# Patient Record
Sex: FEMALE | Race: WHITE | Employment: FULL TIME | URBAN - METROPOLITAN AREA
[De-identification: names, ages, dates, MRNs, and addresses within clinical notes are randomized per-mention and may not be internally consistent; named-entity substitution may affect disease eponyms.]

---

## 2017-05-15 ENCOUNTER — OFFICE VISIT (OUTPATIENT)
Dept: HEMATOLOGY | Age: 22
End: 2017-05-15

## 2017-05-15 VITALS
OXYGEN SATURATION: 100 % | WEIGHT: 132.38 LBS | SYSTOLIC BLOOD PRESSURE: 107 MMHG | DIASTOLIC BLOOD PRESSURE: 65 MMHG | HEIGHT: 65 IN | TEMPERATURE: 98.9 F | BODY MASS INDEX: 22.06 KG/M2 | HEART RATE: 69 BPM

## 2017-05-15 DIAGNOSIS — B18.2 CHRONIC HEPATITIS C WITHOUT HEPATIC COMA (HCC): ICD-10-CM

## 2017-05-15 DIAGNOSIS — B18.2 CHRONIC HEPATITIS C WITHOUT HEPATIC COMA (HCC): Primary | ICD-10-CM

## 2017-05-15 PROBLEM — F41.9 ANXIETY: Status: ACTIVE | Noted: 2017-05-15

## 2017-05-15 PROBLEM — F90.9 ADHD (ATTENTION DEFICIT HYPERACTIVITY DISORDER): Status: ACTIVE | Noted: 2017-05-15

## 2017-05-15 RX ORDER — DEXTROAMPHETAMINE SACCHARATE, AMPHETAMINE ASPARTATE, DEXTROAMPHETAMINE SULFATE AND AMPHETAMINE SULFATE 3.75; 3.75; 3.75; 3.75 MG/1; MG/1; MG/1; MG/1
15 TABLET ORAL 2 TIMES DAILY
COMMUNITY
End: 2018-07-09

## 2017-05-15 RX ORDER — ESCITALOPRAM OXALATE 5 MG/1
TABLET ORAL DAILY
COMMUNITY
End: 2018-07-09

## 2017-05-15 NOTE — PROGRESS NOTES
Jon Pérez MD, LASHAWN Rosario PA-C Earlyne Lease, MD, Beaver, MD Tari Castaneda, LASHAWN Hebert NP        [address] [facility], [address] [address]     [phone]     FAX: 411 [address], [address] [place] [address], [address]     [phone]     FAX: [phone]       Patient Care Team:  Jonas Peterson MD as PCP - General (Family Practice)      Problem List  Date Reviewed: 5/15/2017          Codes Class Noted    Chronic hepatitis C without hepatic coma [facility]) ICD-10-CM: B18.2  ICD-9-CM: 070.54  5/15/2017        Anxiety ICD-10-CM: F41.9  ICD-9-CM: 300.00  5/15/2017        ADHD (attention deficit hyperactivity disorder) ICD-10-CM: F90.9  ICD-9-CM: 314.01  5/15/2017        Intravenous drug abuse in remission ICD-10-CM: F19.10  ICD-9-CM: 305.93  9/14/2013    Overview Signed 5/15/2017 10:56 AM by Shemar Clements MD     Drug use for 3-6 months only during 2014                     The physicians listed above have asked me to see Lisa Romeo in consultation regarding chronic HCV and its management. All medical records sent by the referring physicians were reviewed     The patient is a 24 y.o.  female who was noted to have abnormalities in liver chemistries and subsequently tested positive for chronic HCV in 2/2017. Risk factors for acquiring HCV are IV drug use only for several months during 2014. Imaging of the liver was not performed. An assessment of liver fibrosis with biopsy or elastography has not been performed. The patient has never received treatment for chronic HCV.       The most recent laboratory studies indicate that the liver transaminases are normal, alkaline phosphatase is normal, tests of hepatic synthetic and metabolic function are normal, and the platelet count is normal.    The patient notes fatigue,     The patient has not experienced pain in the right side over the liver,     The patient completes all daily activities without any functional limitations. ALLERGIES  Allergies   Allergen Reactions    Clonidine Hives    Naproxen Other (comments)     Pt states it makes her hot and irritable.  Vicodin [Hydrocodone-Acetaminophen] Nausea Only       MEDICATIONS  Current Outpatient Prescriptions   Medication Sig    dextroamphetamine-amphetamine (ADDERALL) 15 mg tablet Take 15 mg by mouth two (2) times a day.  escitalopram oxalate (LEXAPRO) 5 mg tablet Take  by mouth daily.  ibuprofen (MOTRIN) 600 mg tablet Take 1 tablet by mouth every six (6) hours as needed for Pain.  ondansetron (ZOFRAN ODT) 4 mg disintegrating tablet Take 1 tablet by mouth every eight (8) hours as needed for Nausea.  diclofenac EC (VOLTAREN) 75 mg EC tablet Take 1 tablet by mouth two (2) times a day.  oxyCODONE (OXYIR) 5 mg capsule Take 1 capsule by mouth every four (4) hours as needed.  QUEtiapine (SEROQUEL) 25 mg tablet     venlafaxine-SR (EFFEXOR-XR) 150 mg capsule     amitriptyline (ELAVIL) 75 mg tablet Take 1 Tab by mouth nightly.  citalopram (CELEXA) 40 mg tablet Take 1 Tab by mouth every morning.  LORazepam (ATIVAN) 0.5 mg tablet Take 1 Tab by mouth two (2) times a day.  LORazepam (ATIVAN) 1 mg tablet Take 1 Tab by mouth nightly. No current facility-administered medications for this visit. SYSTEM REVIEW NOT RELATED TO LIVER DISEASE OR REVIEWED ABOVE:  Constitution systems: Negative for fever, chills, weight gain, weight loss. Eyes: Negative for visual changes. ENT: Negative for sore throat, painful swallowing. Respiratory: Negative for cough, hemoptysis, SOB. Cardiology: Negative for chest pain, palpitations. GI:  Negative for constipation or diarrhea.   : Negative for urinary frequency, dysuria, hematuria, nocturia. Skin: Negative for rash. Hematology: Negative for easy bruising, blood clots. Musculo-skelatal: Negative for back pain, muscle pain, weakness. Neurologic: Negative for headaches, dizziness, vertigo, memory problems not related to HE. Psychology: Negative for anxiety, depression. FAMILY HISTORY:  The father is alive and healthy. The mother has the following chronic diseases: skin cancer. There is no family history of liver disease. SOCIAL HISTORY:  The patient has never been . The patient has no children. The patient has never used tobacco products. The patient has never consumed significant amounts of alcohol. The patient currently works full time . PHYSICAL EXAMINATION:  /65  Pulse 69  Temp 98.9 °F (37.2 °C) (Tympanic)   Ht 5' 5\" (1.651 m)  Wt 132 lb 6 oz (60 kg)  SpO2 100%  BMI 22.03 kg/m2  General: No acute distress. Eyes: Sclera anicteric. ENT: No oral lesions. Thyroid normal.  Nodes: No adenopathy. Skin: No spider angiomata. No jaundice. No palmar erythema. Respiratory: Lungs clear to auscultation. Cardiovascular: Regular heart rate. No murmurs. No JVD. Abdomen: Soft non-tender. Liver size normal to percussion/palpation. Spleen not palpable. No obvious ascites. Extremities: No edema. No muscle wasting. No gross arthritic changes. Neurologic: Alert and oriented. Cranial nerves grossly intact. No asterixis. LABORATORY STUDIES:  From 2/2017  AST/ALT/ALP/T Bili/ALB:  29/30/65/0.2/4.4  WBC/HB/PLT/INR:    BUN/CREAT:  13/0.62    SEROLOGIES:  1/2017. HCV RNA Log 2.3 intU  2/2017. HCV genotype 1A, HCV RNA Log 3.9 intU    LIVER HISTOLOGY:  Not available or performed    ENDOSCOPIC PROCEDURES:  Not available or performed    RADIOLOGY:  Not available or performed    OTHER TESTING:  Not available or performed    ASSESSMENT AND PLAN:  Chronic HCV of unclear severity but almost certainly she has no fibrosis.     Liver function is normal.      Will perform laboratory testing to monitor liver function and degree of liver injury. This included BMP, hepatic panel, CBC with platelet count,     Will perform and/or review results of HCV viral load and HCV genotype to define the specific treatment and duration of treatment that will be required. Will perform serologic and virologic studies to assess for other causes of chronic liver disease. Will perform imaging of the liver with ultrasound. There is no reason to perform liver biopsy at this time. The Fibroscan can assess liver fibrosis and determine if a patient has advanced fibrosis or cirrhosis without the need for liver biopsy. The Fibroscan is currently available at [facility]. This will be performed at the next office visit. The patient has not previously been treated for HCV. The patient has HCV genotype 1A. Discussed the treatment alternatives. The SVR/cure rate for HCV now exceeds 90% with just oral anti-viral therapy and no interferon injections or significant side effects for most patients with HCV. The specific treatment is dependent upon genotype, viral load and histology. The patient should be treated with [place] Regulus or [employer]. The SVR/cure rate for either of these regimens exceeds 95%. The patient was directed to continue all current medications at the current dosages. There are no contraindications for the patient to take any medications that are necessary for treatment of other medical issues. The patient was counseled regarding alcohol consumption. The patient was counseled regarding use of illicit drugs. She has now been drug free for 3 years will extremely low likelihood of using drugs again. The need for vaccination against viral hepatitis A and B will be assessed with serologic and instituted as appropriate. All of the above issues were discussed with the patient.   All questions were answered. The patient expressed a clear understanding of the above. Follow-up Faizan Urbina 32 in for Fibroscan, and to initiate HCV treatment.     Jacki Amaya MD  Liver [place] [address], [facility] 22.  [phone]

## 2017-05-16 LAB
ALBUMIN SERPL-MCNC: 4.3 G/DL (ref 3.5–5.5)
ALP SERPL-CCNC: 68 IU/L (ref 39–117)
ALT SERPL-CCNC: 65 IU/L (ref 0–32)
AST SERPL-CCNC: 51 IU/L (ref 0–40)
BASOPHILS # BLD AUTO: 0 X10E3/UL (ref 0–0.2)
BASOPHILS NFR BLD AUTO: 0 %
BILIRUB DIRECT SERPL-MCNC: 0.1 MG/DL (ref 0–0.4)
BILIRUB SERPL-MCNC: 0.4 MG/DL (ref 0–1.2)
BUN SERPL-MCNC: 13 MG/DL (ref 6–20)
BUN/CREAT SERPL: 21 (ref 9–23)
CALCIUM SERPL-MCNC: 9.1 MG/DL (ref 8.7–10.2)
CHLORIDE SERPL-SCNC: 99 MMOL/L (ref 96–106)
CO2 SERPL-SCNC: 25 MMOL/L (ref 18–29)
CREAT SERPL-MCNC: 0.62 MG/DL (ref 0.57–1)
EOSINOPHIL # BLD AUTO: 0.3 X10E3/UL (ref 0–0.4)
EOSINOPHIL NFR BLD AUTO: 5 %
ERYTHROCYTE [DISTWIDTH] IN BLOOD BY AUTOMATED COUNT: 12.3 % (ref 12.3–15.4)
GLUCOSE SERPL-MCNC: 104 MG/DL (ref 65–99)
HAV AB SER QL IA: NEGATIVE
HBV CORE AB SERPL QL IA: NEGATIVE
HBV SURFACE AB SER QL: NON REACTIVE
HBV SURFACE AG SERPL QL IA: NEGATIVE
HCT VFR BLD AUTO: 39.3 % (ref 34–46.6)
HGB BLD-MCNC: 13 G/DL (ref 11.1–15.9)
IMM GRANULOCYTES # BLD: 0 X10E3/UL (ref 0–0.1)
IMM GRANULOCYTES NFR BLD: 0 %
LYMPHOCYTES # BLD AUTO: 2.2 X10E3/UL (ref 0.7–3.1)
LYMPHOCYTES NFR BLD AUTO: 32 %
MCH RBC QN AUTO: 28.8 PG (ref 26.6–33)
MCHC RBC AUTO-ENTMCNC: 33.1 G/DL (ref 31.5–35.7)
MCV RBC AUTO: 87 FL (ref 79–97)
MONOCYTES # BLD AUTO: 0.7 X10E3/UL (ref 0.1–0.9)
MONOCYTES NFR BLD AUTO: 10 %
NEUTROPHILS # BLD AUTO: 3.7 X10E3/UL (ref 1.4–7)
NEUTROPHILS NFR BLD AUTO: 53 %
PLATELET # BLD AUTO: 278 X10E3/UL (ref 150–379)
POTASSIUM SERPL-SCNC: 4.1 MMOL/L (ref 3.5–5.2)
PROT SERPL-MCNC: 7.2 G/DL (ref 6–8.5)
RBC # BLD AUTO: 4.52 X10E6/UL (ref 3.77–5.28)
SODIUM SERPL-SCNC: 138 MMOL/L (ref 134–144)
WBC # BLD AUTO: 7 X10E3/UL (ref 3.4–10.8)

## 2017-05-19 LAB
HCV RNA SERPL NAA+PROBE-ACNC: NORMAL IU/ML
HCV RNA SERPL NAA+PROBE-LOG IU: 4.19 LOG10 IU/ML
HCV RNA SERPL QL NAA+PROBE: POSITIVE
TEST INFORMATION: NORMAL

## 2017-05-26 LAB
HCV NS5 MUT DET ISLT GENOTYP: NORMAL
HCV RESIS PANELL ISLT GENOTYP: NORMAL
REF LAB TEST METHOD: NORMAL

## 2017-07-14 ENCOUNTER — OFFICE VISIT (OUTPATIENT)
Dept: HEMATOLOGY | Age: 22
End: 2017-07-14

## 2017-07-14 VITALS
BODY MASS INDEX: 23.33 KG/M2 | WEIGHT: 140.2 LBS | HEART RATE: 96 BPM | TEMPERATURE: 97.6 F | SYSTOLIC BLOOD PRESSURE: 121 MMHG | OXYGEN SATURATION: 98 % | DIASTOLIC BLOOD PRESSURE: 57 MMHG

## 2017-07-14 DIAGNOSIS — B18.2 CHRONIC HEPATITIS C WITHOUT HEPATIC COMA (HCC): Primary | ICD-10-CM

## 2017-07-14 RX ORDER — LEDIPASVIR AND SOFOSBUVIR 90; 400 MG/1; MG/1
90-400 TABLET, FILM COATED ORAL DAILY
Qty: 28 TAB | Refills: 1 | Status: SHIPPED | OUTPATIENT
Start: 2017-07-14 | End: 2018-07-09 | Stop reason: ALTCHOICE

## 2017-07-14 NOTE — MR AVS SNAPSHOT
Visit Information Date & Time Provider Department Dept. Phone Encounter #  
 7/14/2017  8:30 AM Carolina Walton, [address] [facility] [address] [place] [address] [id number] Upcoming Health Maintenance Date Due  
 HPV AGE 9Y-34Y (1 of 3 - Female 3 Dose Series) 5/27/2005 Pneumococcal 19-64 Medium Risk (1 of 1 - PPSV23) 5/27/2013 DTaP/Tdap/Td series (1 - Tdap) 5/27/2015 PAP AKA CERVICAL CYTOLOGY 5/27/2015 INFLUENZA AGE 9 TO ADULT 8/1/2017 Allergies as of 7/14/2017  Review Complete On: 7/14/2017 By: Sobia Sullivan Severity Noted Reaction Type Reactions Clonidine  05/15/2017    Hives Naproxen  10/30/2014    Other (comments) Pt states it makes her hot and irritable. Vicodin [Hydrocodone-acetaminophen]  09/13/2013    Nausea Only Current Immunizations  Never Reviewed Name Date Influenza High Dose Vaccine PF  Deferred (Patient Refused) Influenza Vaccine (Trivalent)  Deferred (Patient Refused) Not reviewed this visit You Were Diagnosed With   
  
 Codes Comments Chronic hepatitis C without hepatic coma (HCC)    -  Primary ICD-10-CM: B18.2 ICD-9-CM: 070.54 Vitals BP Pulse Temp Weight(growth percentile) SpO2 BMI  
 121/57 96 97.6 °F (36.4 °C) (Oral) 140 lb 3.2 oz (63.6 kg) 98% 23.33 kg/m2 OB Status Smoking Status Having regular periods Former Smoker BMI and BSA Data Body Mass Index Body Surface Area  
 23.33 kg/m 2 1.71 m 2 Preferred Pharmacy Pharmacy Name Phone [facility] [address] [phone] Your Updated Medication List  
  
   
This list is accurate as of: 7/14/17  9:25 AM.  Always use your most recent med list.  
  
  
  
  
 ADDERALL 15 mg tablet Generic drug:  dextroamphetamine-amphetamine Take 15 mg by mouth two (2) times a day. LEXAPRO 5 mg tablet Generic drug:  escitalopram oxalate Take  by mouth daily. ondansetron 4 mg disintegrating tablet Commonly known as:  ZOFRAN ODT Take 1 tablet by mouth every eight (8) hours as needed for Nausea. We Performed the Following LIVER ELASTOGRAPHY W/O IMAG W/I&R [37169 CPT(R)] Introducing [facility] SERVICES! Dear Nazanin Hunt: Thank you for requesting a PT PAL account. Our records indicate that you already have an active PT PAL account. You can access your account anytime at [email or web address] Did you know that you can access your hospital and ER discharge instructions at any time in PT PAL? You can also review all of your test results from your hospital stay or ER visit. Additional Information If you have questions, please visit the Frequently Asked Questions section of the PT PAL website at [email or web address] Remember, PT PAL is NOT to be used for urgent needs. For medical emergencies, dial 911. Now available from your iPhone and Android! Please provide this summary of care documentation to your next provider. Your primary care clinician is listed as Hayley Up. If you have any questions after today's visit, please call [phone].

## 2017-07-14 NOTE — PROGRESS NOTES
Jon Morton MD, LASHAWN Ramirez PA-C Senaida Notice, MD, FACP, MD Tari Haley NP Susannah Charleston, NP        [address] [facility], [address] [address].     [phone]     FAX: 048 [facility], [address] [place] [address], [address]     [phone]     FAX: [phone]       Patient Care Team:  Martine Camacho MD as PCP - General (Family Practice)  Madeline Moreira MD (Gastroenterology)      Problem List  Date Reviewed: 7/14/2017          Codes Class Noted    Chronic hepatitis C without hepatic coma [facility]) ICD-10-CM: B18.2  ICD-9-CM: 070.54  5/15/2017        Anxiety ICD-10-CM: F41.9  ICD-9-CM: 300.00  5/15/2017        ADHD (attention deficit hyperactivity disorder) ICD-10-CM: F90.9  ICD-9-CM: 314.01  5/15/2017        Intravenous drug abuse in remission ICD-10-CM: F19.10  ICD-9-CM: 305.93  9/14/2013    Overview Signed 5/15/2017 10:56 AM by Karlene Mast MD     Drug use for 3-6 months only during 2014                 Kareen Connor returns to the [facility] for management of chronic HCV and to perform in-office fibroscan. The active problem list, all pertinent past medical history, medications, radiologic findings and laboratory findings related to the liver disorder were reviewed with the patient. The patient is a 20 y.o.  female who was noted to have abnormalities in liver chemistries and subsequently tested positive for chronic HCV in 2/2017. Risk factors for acquiring HCV are IV drug use only for several months during 2014. Imaging of the liver was performed in 2014, showing unremarkable liver. An assessment of liver fibrosis with biopsy or elastography has not been performed. This is planned for today's office visit.     The patient has never received treatment for chronic HCV. The most recent laboratory studies indicate that the liver transaminases are normal, alkaline phosphatase is normal, tests of hepatic synthetic and metabolic function are normal, and the platelet count is normal.    The patient notes fatigue. The patient has not experienced pain in the right side over the liver. The patient completes all daily activities without any functional limitations. ALLERGIES  Allergies   Allergen Reactions    Clonidine Hives    Naproxen Other (comments)     Pt states it makes her hot and irritable.  Vicodin [Hydrocodone-Acetaminophen] Nausea Only       MEDICATIONS  Current Outpatient Prescriptions   Medication Sig    dextroamphetamine-amphetamine (ADDERALL) 15 mg tablet Take 15 mg by mouth two (2) times a day.  escitalopram oxalate (LEXAPRO) 5 mg tablet Take  by mouth daily.  ondansetron (ZOFRAN ODT) 4 mg disintegrating tablet Take 1 tablet by mouth every eight (8) hours as needed for Nausea. No current facility-administered medications for this visit. SYSTEM REVIEW NOT RELATED TO LIVER DISEASE OR REVIEWED ABOVE:  Constitution systems: Negative for fever, chills, weight gain, weight loss. Eyes: Negative for visual changes. ENT: Negative for sore throat, painful swallowing. Respiratory: Negative for cough, hemoptysis, SOB. Cardiology: Negative for chest pain, palpitations. GI:  Negative for constipation or diarrhea. : Negative for urinary frequency, dysuria, hematuria, nocturia. Skin: Negative for rash. Hematology: Negative for easy bruising, blood clots. Musculo-skeletal: Negative for back pain, muscle pain, weakness. Neurologic: Negative for headaches, dizziness, vertigo, memory problems not related to HE. Psychology: Negative for anxiety, depression. FAMILY HISTORY:  The father is alive and healthy. The mother has the following chronic diseases: skin cancer.     There is no family history of liver disease. SOCIAL HISTORY:  The patient has never been . The patient has no children. The patient has never used tobacco products. The patient has never consumed significant amounts of alcohol. The patient currently works full time . PHYSICAL EXAMINATION:  /57  Pulse 96  Temp 97.6 °F (36.4 °C) (Oral)   Wt 140 lb 3.2 oz (63.6 kg)  SpO2 98%  BMI 23.33 kg/m2  General: No acute distress. Eyes: Sclera anicteric. ENT: No oral lesions. Thyroid normal.  Nodes: No adenopathy. Skin: No spider angiomata. No jaundice. No palmar erythema. Respiratory: Lungs clear to auscultation. Cardiovascular: Regular heart rate. No murmurs. No JVD. Abdomen: Soft non-tender. Liver size normal to percussion/palpation. Spleen not palpable. No obvious ascites. Extremities: No edema. No muscle wasting. No gross arthritic changes. Neurologic: Alert and oriented. Cranial nerves grossly intact. No asterixis.     LABORATORY STUDIES:  [address] & Units 5/15/2017 11/17/2014   WBC 3.4 - 10.8 x10E3/uL 7.0 7.5   ANC 1.4 - 7.0 x10E3/uL 3.7 3.5   HGB 11.1 - 15.9 g/dL 13.0 12.1    - 379 x10E3/uL 278 285   AST 0 - 40 IU/L 51 (H) 568 (H)   ALT 0 - 32 IU/L 65 (H) 856 (H)   Alk Phos 39 - 117 IU/L 68 152 (H)   Bili, Total 0.0 - 1.2 mg/dL 0.4 3.9 (H)   Bili, Direct 0.00 - 0.40 mg/dL 0.10 2.9 (H)   Albumin 3.5 - 5.5 g/dL 4.3 3.3 (L)   BUN 6 - 20 mg/dL 13 9   Creat 0.57 - 1.00 mg/dL 0.62 0.63   Na 134 - 144 mmol/L 138 138   K 3.5 - 5.2 mmol/L 4.1 3.9   Cl 96 - 106 mmol/L 99 103   CO2 18 - 29 mmol/L 25 26   Glucose 65 - 99 mg/dL 104 (H) 105 (H)     Liver [facility] Latest Ref Rng & Units 10/8/2014   WBC 3.4 - 10.8 x10E3/uL 9.8   ANC 1.4 - 7.0 x10E3/uL 3.8   HGB 11.1 - 15.9 g/dL 11.4 (L)    - 379 x10E3/uL 298   AST 0 - 40 IU/L    ALT 0 - 32 IU/L    Alk Phos 39 - 117 IU/L    Bili, Total 0.0 - 1.2 mg/dL    Bili, Direct 0.00 - 0.40 mg/dL    Albumin 3.5 - 5.5 g/dL    BUN 6 - 20 mg/dL 14   Creat 0.57 - 1.00 mg/dL 0.70   Na 134 - 144 mmol/L 139   K 3.5 - 5.2 mmol/L 4.0   Cl 96 - 106 mmol/L 102   CO2 18 - 29 mmol/L 26   Glucose 65 - 99 mg/dL 99   From 2/2017  AST/ALT/ALP/T Bili/ALB:  29/30/65/0.2/4.4  WBC/HB/PLT/INR:    BUN/CREAT:  13/0.62    SEROLOGIES:  Serologies Latest Ref Rng & Units 5/15/2017 11/17/2014   Hep A Ab, Total Negative Negative    Hep B Surface Ag Negative Negative NEGATIVE   Hep B Core Ab, Total Negative Negative    Hep B Surface AB QL  Non Reactive    Hep C Ab 0.0 - 0.9 s/co ratio     HCV RT-PCR, Quant IU/mL [id number]      Serologies Latest Ref Rng & Units 8/15/2014   Hep A Ab, Total Negative    Hep B Surface Ag Negative Negative   Hep B Core Ab, Total Negative    Hep B Surface AB QL     Hep C Ab 0.0 - 0.9 s/co ratio <0.1   HCV RT-PCR, Quant IU/mL    1/2017. HCV RNA Log 2.3 intU  2/2017. HCV genotype 1A, HCV RNA Log 3.9 intU    LIVER HISTOLOGY:  7/2017. FibroScan performed at [address]. EkPa was 4.8. Suggested fibrosis level is F0-1. ENDOSCOPIC PROCEDURES:  Not available or performed    RADIOLOGY:  11/2014. Ultrasound of liver. Normal appearing liver. No liver mass lesions. OTHER TESTING:  Not available or performed    ASSESSMENT AND PLAN:  Chronic HCV in the setting of minimal to portal fibrosis. Liver function is normal.      Serologic and virologic studies to assess for other causes of chronic liver disease were negative. The patient has not previously been treated for HCV. The patient has HCV genotype 1A. We have discussed the treatment alternatives. The SVR/cure rate for HCV now exceeds 90% with just oral anti-viral therapy and no interferon injections or significant side effects for most patients with HCV. The specific treatment is dependent upon genotype, viral load and histology.   Given her baseline viral characteristics, she should be treated with a 8 week course of [place] the SVR/cure rate for this regimen exceeds 95%. The patient was directed to continue all current medications at the current dosages. There are no contraindications for the patient to take any medications that are necessary for treatment of other medical issues. The patient was counseled regarding alcohol consumption. The patient was counseled regarding use of illicit drugs. She has now been drug free for 3 years will extremely low likelihood of using drugs again. Vaccination for viral hepatitis A and B is recommended since the patient has no serologic evidence of previous exposure or vaccination with immunity. All of the above issues were discussed with the patient. All questions were answered. The patient expressed a clear understanding of the above. [address] [address] within 4 weeks of initiation of HCV treatment.     Zach Wise PA-C  Liver [place] [address], [address] [address]  [address].  [phone]

## 2018-07-09 ENCOUNTER — OFFICE VISIT (OUTPATIENT)
Dept: HEMATOLOGY | Age: 23
End: 2018-07-09

## 2018-07-09 VITALS
HEART RATE: 70 BPM | WEIGHT: 150.2 LBS | BODY MASS INDEX: 24.99 KG/M2 | TEMPERATURE: 98.3 F | SYSTOLIC BLOOD PRESSURE: 115 MMHG | OXYGEN SATURATION: 95 % | DIASTOLIC BLOOD PRESSURE: 76 MMHG

## 2018-07-09 DIAGNOSIS — B18.2 CHRONIC HEPATITIS C WITHOUT HEPATIC COMA (HCC): Primary | ICD-10-CM

## 2018-07-09 RX ORDER — TRAZODONE HYDROCHLORIDE 100 MG/1
TABLET ORAL
Refills: 1 | COMMUNITY
Start: 2018-07-07

## 2018-07-09 RX ORDER — VENLAFAXINE HYDROCHLORIDE 75 MG/1
CAPSULE, EXTENDED RELEASE ORAL
Refills: 1 | COMMUNITY
Start: 2018-07-02

## 2018-07-09 NOTE — MR AVS SNAPSHOT
[address] [facility] .[phone] [place] [address] 
[phone] Patient: Santi Montaño MRN: [id number] :1995 Visit Information Date & Time Provider Department Dept. Phone Encounter #  
 2018  1:30 PM Hayde Valiente, [address] [address] [id number] Follow-up Instructions Return in about 3 months (around 10/9/2018) for Marily Briones. Your Appointments 10/9/2018  2:30 PM  
Follow Up with MIRIAM Major 75 [facility] Appt Note: Follow up appt with Ranjana Hernández 80 [facility] .[phone] [facility] [address]  
59 [facility] Jerel [address] Upcoming Health Maintenance Date Due  
 HPV Age 9Y-34Y (1 of 1 - Female 3 Dose Series) 2005 Pneumococcal 19-64 Medium Risk (1 of 1 - PPSV23) 2013 DTaP/Tdap/Td series (1 - Tdap) 2015 PAP AKA CERVICAL CYTOLOGY 2015 Influenza Age 5 to Adult 2018 Allergies as of 2018  Review Complete On: 2018 By: MIRIAM Major Severity Noted Reaction Type Reactions Clonidine  05/15/2017    Hives Naproxen  10/30/2014    Other (comments) Pt states it makes her hot and irritable. Vicodin [Hydrocodone-acetaminophen]  2013    Nausea Only Current Immunizations  Never Reviewed Name Date Influenza High Dose Vaccine PF  Deferred (Patient Refused) Influenza Vaccine (Trivalent)  Deferred (Patient Refused) Not reviewed this visit You Were Diagnosed With   
  
 Codes Comments Chronic hepatitis C without hepatic coma (HCC)    -  Primary ICD-10-CM: B18.2 ICD-9-CM: 070.54 Vitals BP Pulse Temp Weight(growth percentile) SpO2 BMI  
 115/76 (BP 1 Location: Right arm, BP Patient Position: Sitting) 70 98.3 °F (36.8 °C) (Tympanic) 150 lb 3.2 oz (68.1 kg) 95% 24.99 kg/m2 OB Status Smoking Status Having regular periods Former Smoker BMI and BSA Data Body Mass Index Body Surface Area 24.99 kg/m 2 1.77 m 2 Preferred Pharmacy Pharmacy Name Phone [facility], [address] [place] [address] Florencia Mazariegos Said [phone] Your Updated Medication List  
  
   
This list is accurate as of 7/9/18  2:04 PM.  Always use your most recent med list.  
  
  
  
  
 ondansetron 4 mg disintegrating tablet Commonly known as:  ZOFRAN ODT Take 1 tablet by mouth every eight (8) hours as needed for Nausea. traZODone 100 mg tablet Commonly known as:  DESYREL  
TK 2 TS PO  HS PRN FOR INSOMNIA  
  
 venlafaxine-SR 75 mg capsule Commonly known as:  EFFEXOR-XR TK 1 C PO DAILY We Performed the Following CBC W/O DIFF [14536 CPT(R)] HCV RNA BY ADIA QL,RFLX TO QT [63755 CPT(R)] HEPATIC FUNCTION PANEL (6) [[id number] Custom] Follow-up Instructions Return in about 3 months (around 10/9/2018) for Alli Abbott. Introducing [facility] SERVICES! Dear Jennifer Mehta: Thank you for requesting a Stootie account. Our records indicate that you already have an active Stootie account. You can access your account anytime at [email or web address] Did you know that you can access your hospital and ER discharge instructions at any time in Stootie? You can also review all of your test results from your hospital stay or ER visit. Additional Information If you have questions, please visit the Frequently Asked Questions section of the Stootie website at [email or web address] Remember, Stootie is NOT to be used for urgent needs. For medical emergencies, dial 911. Now available from your iPhone and Android! Please provide this summary of care documentation to your next provider. Your primary care clinician is listed as hTeo Osborne.  If you have any questions after today's visit, please call [phone].

## 2018-07-09 NOTE — PROGRESS NOTES
Jon Almaguer MD, Clyde Montes, LIAN Rosenthal MD, FACP, Ashlie Caballero MD     [facility], LASHAWN Holley NP        [address] [facility], [address] [address]  [address].     [phone]     FAX: [address], [address]     [phone]     FAX: [phone]       Patient Care Team:  Jaclyn Soares MD as PCP - General (Family Practice)  Emely Walker MD (Gastroenterology)      Problem List  Date Reviewed: 7/14/2017          Codes Class Noted    Chronic hepatitis C without hepatic coma [facility]) ICD-10-CM: B18.2  ICD-9-CM: 070.54  5/15/2017        Anxiety ICD-10-CM: F41.9  ICD-9-CM: 300.00  5/15/2017        ADHD (attention deficit hyperactivity disorder) ICD-10-CM: F90.9  ICD-9-CM: 314.01  5/15/2017        Intravenous drug abuse in remission ICD-10-CM: F19.11  ICD-9-CM: 305.93  9/14/2013    Overview Signed 5/15/2017 10:56 AM by Tushar Valiente MD     Drug use for 3-6 months only during 2014                 Damaris Mattson returns to the [facility] for management of chronic HCV. The active problem list, all pertinent past medical history, medications, radiologic findings and laboratory findings related to the liver disorder were reviewed with the patient. The patient is a 24 y.o.  female who was noted to have abnormalities in liver chemistries and subsequently tested positive for chronic HCV in 2/2017. Risk factors for acquiring HCV are IV drug use only for several months during 2014. Imaging of the liver was performed in 2014, showing unremarkable liver. An assessment of liver fibrosis with elastography has been performed last year prior to the initiation of therapy.   This showed a score of 4.8, consistent with minimal to no fibrosis. The patient had medications approved last year and started 8/2/2017. She took 2-3 days at that time when she had a significant break-up with her boyfriend and he wouldn't release her medications to her. She has had to threaten legal action to get these medications back and she retrieved them in 5/2018. She reports that she has now completed her 2 months' of therapy, minus the 2-3 days taken last year. She tolerated medications well and has no new complaints at this time. The most recent laboratory studies indicate that the liver transaminases are normal, alkaline phosphatase is normal, tests of hepatic synthetic and metabolic function are normal, and the platelet count is normal.    The patient notes fatigue. The patient has not experienced pain in the right side over the liver. The patient completes all daily activities without any functional limitations. She had a miscarriage at 5 weeks as of 2 weeks ago. This was not a planned pregnancy. She is continuing to have some low abdominal cramping without spotting. ALLERGIES  Allergies   Allergen Reactions    Clonidine Hives    Naproxen Other (comments)     Pt states it makes her hot and irritable.  Vicodin [Hydrocodone-Acetaminophen] Nausea Only       MEDICATIONS  Current Outpatient Prescriptions   Medication Sig    venlafaxine-SR (EFFEXOR-XR) 75 mg capsule TK 1 C PO DAILY    traZODone (DESYREL) 100 mg tablet TK 2 TS PO  HS PRN FOR INSOMNIA    ondansetron (ZOFRAN ODT) 4 mg disintegrating tablet Take 1 tablet by mouth every eight (8) hours as needed for Nausea.  dextroamphetamine-amphetamine (ADDERALL) 15 mg tablet Take 15 mg by mouth two (2) times a day.  escitalopram oxalate (LEXAPRO) 5 mg tablet Take  by mouth daily. No current facility-administered medications for this visit.         SYSTEM REVIEW NOT RELATED TO LIVER DISEASE OR REVIEWED ABOVE:  Constitution systems: Negative for fever, chills, weight gain, weight loss.   Eyes: Negative for visual changes. ENT: Negative for sore throat, painful swallowing. Respiratory: Negative for cough, hemoptysis, SOB. Cardiology: Negative for chest pain, palpitations. GI:  Negative for constipation or diarrhea. : Negative for urinary frequency, dysuria, hematuria, nocturia. Skin: Negative for rash. Hematology: Negative for easy bruising, blood clots. Musculo-skeletal: Negative for back pain, muscle pain, weakness. Neurologic: Negative for headaches, dizziness, vertigo, memory problems not related to HE. Psychology: Negative for anxiety, depression. FAMILY HISTORY:  The father is alive and healthy. The mother has the following chronic diseases: skin cancer. There is no family history of liver disease. SOCIAL HISTORY:  The patient has never been . The patient has no children. The patient has never used tobacco products. The patient has never consumed significant amounts of alcohol. The patient currently works full time . PHYSICAL EXAMINATION:  /76 (BP 1 Location: Right arm, BP Patient Position: Sitting)  Pulse 70  Temp 98.3 °F (36.8 °C) (Tympanic)   Wt 150 lb 3.2 oz (68.1 kg)  SpO2 95%  BMI 24.99 kg/m2  General: No acute distress. Eyes: Sclera anicteric. ENT: No oral lesions. Thyroid normal.  Nodes: No adenopathy. Skin: No spider angiomata. No jaundice. No palmar erythema. Respiratory: Lungs clear to auscultation. Cardiovascular: Regular heart rate. No murmurs. No JVD. Abdomen: Soft non-tender. Liver size normal to percussion/palpation. Spleen not palpable. No obvious ascites. Extremities: No edema. No muscle wasting. No gross arthritic changes. Neurologic: Alert and oriented. Cranial nerves grossly intact. No asterixis.     LABORATORY STUDIES:  [facility] [address] Units 5/15/2017 11/17/2014   WBC 3.4 - 10.8 x10E3/uL 7.0 7.5   ANC 1.4 - 7.0 x10E3/uL 3.7 3.5   HGB 11.1 - 15.9 g/dL 13.0 12.1    - 379 x10E3/uL 278 285   AST 0 - 40 IU/L 51 (H) 568 (H)   ALT 0 - 32 IU/L 65 (H) 856 (H)   Alk Phos 39 - 117 IU/L 68 152 (H)   Bili, Total 0.0 - 1.2 mg/dL 0.4 3.9 (H)   Bili, Direct 0.00 - 0.40 mg/dL 0.10 2.9 (H)   Albumin 3.5 - 5.5 g/dL 4.3 3.3 (L)   BUN 6 - 20 mg/dL 13 9   Creat 0.57 - 1.00 mg/dL 0.62 0.63   Na 134 - 144 mmol/L 138 138   K 3.5 - 5.2 mmol/L 4.1 3.9   Cl 96 - 106 mmol/L 99 103   CO2 18 - 29 mmol/L 25 26   Glucose 65 - 99 mg/dL 104 (H) 105 (H)     Liver [facility] Latest Ref Rng & Units 10/8/2014   WBC 3.4 - 10.8 x10E3/uL 9.8   ANC 1.4 - 7.0 x10E3/uL 3.8   HGB 11.1 - 15.9 g/dL 11.4 (L)    - 379 x10E3/uL 298   AST 0 - 40 IU/L    ALT 0 - 32 IU/L    Alk Phos 39 - 117 IU/L    Bili, Total 0.0 - 1.2 mg/dL    Bili, Direct 0.00 - 0.40 mg/dL    Albumin 3.5 - 5.5 g/dL    BUN 6 - 20 mg/dL 14   Creat 0.57 - 1.00 mg/dL 0.70   Na 134 - 144 mmol/L 139   K 3.5 - 5.2 mmol/L 4.0   Cl 96 - 106 mmol/L 102   CO2 18 - 29 mmol/L 26   Glucose 65 - 99 mg/dL 99   From 2/2017  AST/ALT/ALP/T Bili/ALB:  29/30/65/0.2/4.4  WBC/HB/PLT/INR:    BUN/CREAT:  13/0.62    Additional lab values drawn at today's office visit are pending at the time of documentation. SEROLOGIES:  [facility] Latest Ref Rng & Units 5/15/2017 11/17/2014   Hep A Ab, Total Negative Negative    Hep B Surface Ag Negative Negative NEGATIVE   Hep B Core Ab, Total Negative Negative    Hep B Surface AB QL  Non Reactive    Hep C Ab 0.0 - 0.9 s/co ratio     HCV RT-PCR, Quant IU/mL 83704      [facility] Latest Ref Rng & Units 8/15/2014   Hep A Ab, Total Negative    Hep B Surface Ag Negative Negative   Hep B Core Ab, Total Negative    Hep B Surface AB QL     Hep C Ab 0.0 - 0.9 s/co ratio <0.1   HCV RT-PCR, Quant IU/mL    1/2017. HCV RNA Log 2.3 intU  2/2017. HCV genotype 1A, HCV RNA Log 3.9 intU    LIVER HISTOLOGY:  7/2017. FibroScan performed at [facility]. EkPa was 4.8.   Suggested fibrosis level is F0-1.    ENDOSCOPIC PROCEDURES:  Not available or performed    RADIOLOGY:  11/2014. Ultrasound of liver. Normal appearing liver. No liver mass lesions. OTHER TESTING:  Not available or performed    ASSESSMENT AND PLAN:  Chronic hepatitis C, genotype 1a, in the setting of minimal to no fibrosis. Wendy Okeefe tolerated medication for treatment of HCV well and has completed 8 weeks as of 2 days ago. Side effects of the current oral treatment have been minimal and she has no issues at this time. Lab values today for monitoring purposes will include hepatic function panel, CBC, and HCV RNA. The patient was directed to continue all current medications at the current dosages. There are no contraindications for the patient to take any medications that are necessary for treatment of other medical issues. The patient was counseled regarding alcohol consumption. The patient was counseled regarding use of illicit drugs. She has now been drug free for 3 years will extremely low likelihood of using drugs again. Vaccination for viral hepatitis A and B is recommended since the patient has no serologic evidence of previous exposure or vaccination with immunity. All of the above issues were discussed with the patient. All questions were answered. The patient expressed a clear understanding of the above. [address] [address] in 3 months for verification of long-term response.      Macie Craig PA-C  [facility] [place] of [address] [place] [address], [address] [address]  [address].  [phone]

## 2018-07-09 NOTE — PROGRESS NOTES
1. Have you been to the ER, urgent care clinic since your last visit? Hospitalized since your last visit? Yes Where: Miscarriage ([address] in [place]) 6/2018     2. Have you seen or consulted any other health care providers outside of the [address] [place] [address] since your last visit? Include any pap smears or colon screening. No    Chief Complaint   Patient presents with    Follow-up     Visit Vitals    /76 (BP 1 Location: Right arm, BP Patient Position: Sitting)    Pulse 70    Temp 98.3 °F (36.8 °C) (Tympanic)    Wt 150 lb 3.2 oz (68.1 kg)    SpO2 95%    BMI 24.99 kg/m2     PHQ over the last two weeks 5/15/2017   Little interest or pleasure in doing things Not at all   Feeling down, depressed or hopeless Not at all   Total Score PHQ 2 0     Learning Assessment 7/9/2018   PRIMARY LEARNER Patient   BARRIERS PRIMARY LEARNER NONE   CO-LEARNER CAREGIVER No   PRIMARY LANGUAGE ENGLISH   LEARNER PREFERENCE PRIMARY LISTENING   ANSWERED BY patient   RELATIONSHIP SELF     Abuse Screening Questionnaire 7/9/2018   Do you ever feel afraid of your partner? N   Are you in a relationship with someone who physically or mentally threatens you? N   Is it safe for you to go home?  [place]

## 2018-07-10 LAB
ALBUMIN SERPL-MCNC: 4.3 G/DL (ref 3.5–5.5)
ALP SERPL-CCNC: 99 IU/L (ref 39–117)
ALT SERPL-CCNC: 41 IU/L (ref 0–32)
AST SERPL-CCNC: 33 IU/L (ref 0–40)
BILIRUB DIRECT SERPL-MCNC: 0.08 MG/DL (ref 0–0.4)
BILIRUB SERPL-MCNC: 0.3 MG/DL (ref 0–1.2)
ERYTHROCYTE [DISTWIDTH] IN BLOOD BY AUTOMATED COUNT: 13.9 % (ref 12.3–15.4)
HCT VFR BLD AUTO: 36.9 % (ref 34–46.6)
HGB BLD-MCNC: 12.2 G/DL (ref 11.1–15.9)
MCH RBC QN AUTO: 29 PG (ref 26.6–33)
MCHC RBC AUTO-ENTMCNC: 33.1 G/DL (ref 31.5–35.7)
MCV RBC AUTO: 88 FL (ref 79–97)
PLATELET # BLD AUTO: 247 X10E3/UL (ref 150–379)
RBC # BLD AUTO: 4.21 X10E6/UL (ref 3.77–5.28)
WBC # BLD AUTO: 8 X10E3/UL (ref 3.4–10.8)

## 2018-07-11 LAB — HCV RNA SERPL QL NAA+PROBE: NEGATIVE

## 2018-07-12 NOTE — PROGRESS NOTES
Pt notified of stable labs and negative HCV RNA at the conclusion of therapy. Follow-up in 3 months as scheduled.
